# Patient Record
Sex: FEMALE | Race: ASIAN | NOT HISPANIC OR LATINO | URBAN - METROPOLITAN AREA
[De-identification: names, ages, dates, MRNs, and addresses within clinical notes are randomized per-mention and may not be internally consistent; named-entity substitution may affect disease eponyms.]

---

## 2020-04-01 ENCOUNTER — OUTPATIENT (OUTPATIENT)
Dept: OUTPATIENT SERVICES | Facility: HOSPITAL | Age: 55
LOS: 1 days | End: 2020-04-01

## 2020-04-08 ENCOUNTER — EMERGENCY (EMERGENCY)
Facility: HOSPITAL | Age: 55
LOS: 1 days | Discharge: HOME | End: 2020-04-08
Admitting: EMERGENCY MEDICINE
Payer: MEDICAID

## 2020-04-08 VITALS
TEMPERATURE: 99 F | RESPIRATION RATE: 20 BRPM | HEART RATE: 85 BPM | OXYGEN SATURATION: 98 % | SYSTOLIC BLOOD PRESSURE: 137 MMHG | DIASTOLIC BLOOD PRESSURE: 86 MMHG

## 2020-04-08 PROCEDURE — 71045 X-RAY EXAM CHEST 1 VIEW: CPT | Mod: 26

## 2020-04-08 PROCEDURE — 99283 EMERGENCY DEPT VISIT LOW MDM: CPT

## 2020-04-08 RX ORDER — LISINOPRIL/HYDROCHLOROTHIAZIDE 10-12.5 MG
1 TABLET ORAL
Qty: 0 | Refills: 0 | DISCHARGE

## 2020-04-08 RX ORDER — FENOFIBRATE,MICRONIZED 130 MG
1 CAPSULE ORAL
Qty: 0 | Refills: 0 | DISCHARGE

## 2020-04-08 RX ORDER — METFORMIN HYDROCHLORIDE 850 MG/1
1 TABLET ORAL
Qty: 0 | Refills: 0 | DISCHARGE

## 2020-04-08 NOTE — ED PROVIDER NOTE - CLINICAL SUMMARY MEDICAL DECISION MAKING FREE TEXT BOX
55 yo female with history of HTN, HLD, DM2 presents with complaint of 1 week of cough, wheezing, SOB, weakness, mild HA and decreased appetite. She denies N/V/D. On exam she maintains ambulatory oxygen saturation of 94% and 97% at rest, chest X-ray demonstrating mild patchy opacities bilaterally. Pt. advised to self quarantine x 14 days,  practice deep breathing exercises, keep hydrated and to treat symptoms such as Fever and headache with OTC Tylenol.

## 2020-04-08 NOTE — ED PROVIDER NOTE - NSFOLLOWUPINSTRUCTIONS_ED_ALL_ED_FT
You received verbal instructions and did not sign because of the risk of infection, and expressed understanding of the discharge instructions. Please followup with your doctor (call) and consider follow up in his/her office. Please practice good hand hygiene and social distancing. If fever, cough, shortness of breath, or any worse symptoms return to the ER.  If you have symptoms, consideration to quarantine yourself for 14 days from start of symptoms should be considered.  You can call [phone] for any Covid related questions or your local department of health. ([facility] Dept of Health [phone], or [facility]).

## 2020-04-08 NOTE — ED PROVIDER NOTE - OBJECTIVE STATEMENT
53 yo female with history of HTN, HLD, DM2 presents with complaint of 1 week of cough, wheezing, SOB, weakness, mild HA and decreased appetite. She denies N/V/D.

## 2020-04-08 NOTE — ED PROVIDER NOTE - CHIEF COMPLAINT
Telephone Encounter by Alda Ray RN, MSN at 05/23/17 11:15 AM     Author:  Alda Ray RN, MSN Service:  (none) Author Type:  Registered Nurse     Filed:  05/23/17 11:15 AM Encounter Date:  5/22/2017 Status:  Signed     :  Alda Ray RN, MSN (Registered Nurse)       From: Reji N Merritt  To: Amy Mariano DO  Sent: 5/22/2017  8:52 PM CDT  Subject: Acute Illness  Advice    This message is being sent by Micaela Posadas on behalf of Reji Bang    JUNAID  WHILE ON A SHORT VACATION TO [facility] IN [place] THIS PAST WEEKEND MY  DAD ENDED UP IN THE HOSPITAL .  HE PASSED OUT AT BREAKFAST  WAS   CALLED.  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 MY   BROTHERS AND  I BROUGHT HIM HOME AND ADMITTED HIM TO [facility].  WAITING ON DR. GOMEZ FOR REFEREL.       Revision History        Date/Time User Provider Type Action    > 05/23/17 11:15 AM Alda Ray RN, MSN Registered Nurse Sign    Attribution information within the note text is not available.             The patient is a 54y Female complaining of shortness of breath.

## 2020-04-08 NOTE — ED PROVIDER NOTE - PATIENT PORTAL LINK FT
You can access the FollowMyHealth Patient Portal offered by [facility] by registering at the following website: [email or web address]. By joining [employer]’s FollowMyHealth portal, you will also be able to view your health information using other applications (apps) compatible with our system.

## 2020-04-08 NOTE — ED PROVIDER NOTE - PLAN OF CARE
55 yo female with history of HTN, HLD, DM2 presents with complaint of 1 week of cough, wheezing, SOB, weakness, mild HA and decreased appetite. She denies N/V/D. On exam she maintains oxygen saturation of 94%, chest X-ray demonstrating... 53 yo female with history of HTN, HLD, DM2 presents with complaint of 1 week of cough, wheezing, SOB, weakness, mild HA and decreased appetite. She denies N/V/D. On exam she maintains ambulatory oxygen saturation of 94% and 97% at rest, chest X-ray demonstrating mild patchy opacities bilaterally. Pt. advised to self quarantine x 14 days,  practice deep breathing exercises, keep hydrated and to treat symptoms such as Fever and headache with OTC Tylenol.

## 2020-04-08 NOTE — ED PROVIDER NOTE - CARE PLAN
Principal Discharge DX:	Viral syndrome  Assessment and plan of treatment:	53 yo female with history of HTN, HLD, DM2 presents with complaint of 1 week of cough, wheezing, SOB, weakness, mild HA and decreased appetite. She denies N/V/D. On exam she maintains oxygen saturation of 94%, chest X-ray demonstrating... Principal Discharge DX:	Viral syndrome  Assessment and plan of treatment:	55 yo female with history of HTN, HLD, DM2 presents with complaint of 1 week of cough, wheezing, SOB, weakness, mild HA and decreased appetite. She denies N/V/D. On exam she maintains ambulatory oxygen saturation of 94% and 97% at rest, chest X-ray demonstrating mild patchy opacities bilaterally. Pt. advised to self quarantine x 14 days,  practice deep breathing exercises, keep hydrated and to treat symptoms such as Fever and headache with OTC Tylenol.

## 2020-04-10 DIAGNOSIS — Z71.89 OTHER SPECIFIED COUNSELING: ICD-10-CM

## 2020-06-01 ENCOUNTER — OUTPATIENT (OUTPATIENT)
Dept: OUTPATIENT SERVICES | Facility: HOSPITAL | Age: 55
LOS: 1 days | End: 2020-06-01

## 2020-06-02 DIAGNOSIS — Z71.89 OTHER SPECIFIED COUNSELING: ICD-10-CM

## 2020-06-02 PROBLEM — I10 ESSENTIAL (PRIMARY) HYPERTENSION: Chronic | Status: ACTIVE | Noted: 2020-04-08

## 2020-06-02 PROBLEM — E78.5 HYPERLIPIDEMIA, UNSPECIFIED: Chronic | Status: ACTIVE | Noted: 2020-04-08

## 2022-07-07 ENCOUNTER — EMERGENCY (EMERGENCY)
Facility: HOSPITAL | Age: 57
LOS: 1 days | Discharge: HOME | End: 2022-07-07
Attending: STUDENT IN AN ORGANIZED HEALTH CARE EDUCATION/TRAINING PROGRAM | Admitting: STUDENT IN AN ORGANIZED HEALTH CARE EDUCATION/TRAINING PROGRAM

## 2022-07-07 VITALS
HEART RATE: 67 BPM | TEMPERATURE: 98 F | RESPIRATION RATE: 17 BRPM | DIASTOLIC BLOOD PRESSURE: 81 MMHG | OXYGEN SATURATION: 100 % | SYSTOLIC BLOOD PRESSURE: 166 MMHG

## 2022-07-07 VITALS
HEART RATE: 52 BPM | RESPIRATION RATE: 20 BRPM | SYSTOLIC BLOOD PRESSURE: 140 MMHG | DIASTOLIC BLOOD PRESSURE: 72 MMHG | TEMPERATURE: 98 F | OXYGEN SATURATION: 100 %

## 2022-07-07 LAB
ALBUMIN SERPL ELPH-MCNC: 4.1 G/DL — SIGNIFICANT CHANGE UP (ref 3.3–5)
ALP SERPL-CCNC: 90 U/L — SIGNIFICANT CHANGE UP (ref 40–120)
ALT FLD-CCNC: 12 U/L — SIGNIFICANT CHANGE UP (ref 4–33)
ANION GAP SERPL CALC-SCNC: 12 MMOL/L — SIGNIFICANT CHANGE UP (ref 7–14)
AST SERPL-CCNC: 17 U/L — SIGNIFICANT CHANGE UP (ref 4–32)
BASOPHILS # BLD AUTO: 0.02 K/UL — SIGNIFICANT CHANGE UP (ref 0–0.2)
BASOPHILS NFR BLD AUTO: 0.3 % — SIGNIFICANT CHANGE UP (ref 0–2)
BILIRUB SERPL-MCNC: 0.5 MG/DL — SIGNIFICANT CHANGE UP (ref 0.2–1.2)
BUN SERPL-MCNC: 17 MG/DL — SIGNIFICANT CHANGE UP (ref 7–23)
CALCIUM SERPL-MCNC: 9.9 MG/DL — SIGNIFICANT CHANGE UP (ref 8.4–10.5)
CHLORIDE SERPL-SCNC: 103 MMOL/L — SIGNIFICANT CHANGE UP (ref 98–107)
CO2 SERPL-SCNC: 25 MMOL/L — SIGNIFICANT CHANGE UP (ref 22–31)
CREAT SERPL-MCNC: 0.64 MG/DL — SIGNIFICANT CHANGE UP (ref 0.5–1.3)
EGFR: 102 ML/MIN/1.73M2 — SIGNIFICANT CHANGE UP
EOSINOPHIL # BLD AUTO: 0.2 K/UL — SIGNIFICANT CHANGE UP (ref 0–0.5)
EOSINOPHIL NFR BLD AUTO: 3.4 % — SIGNIFICANT CHANGE UP (ref 0–6)
GLUCOSE SERPL-MCNC: 118 MG/DL — HIGH (ref 70–99)
HCT VFR BLD CALC: 38.8 % — SIGNIFICANT CHANGE UP (ref 34.5–45)
HGB BLD-MCNC: 12.2 G/DL — SIGNIFICANT CHANGE UP (ref 11.5–15.5)
IANC: 3.37 K/UL — SIGNIFICANT CHANGE UP (ref 1.8–7.4)
IMM GRANULOCYTES NFR BLD AUTO: 0.2 % — SIGNIFICANT CHANGE UP (ref 0–1.5)
LYMPHOCYTES # BLD AUTO: 1.78 K/UL — SIGNIFICANT CHANGE UP (ref 1–3.3)
LYMPHOCYTES # BLD AUTO: 30.6 % — SIGNIFICANT CHANGE UP (ref 13–44)
MAGNESIUM SERPL-MCNC: 1.7 MG/DL — SIGNIFICANT CHANGE UP (ref 1.6–2.6)
MCHC RBC-ENTMCNC: 26.3 PG — LOW (ref 27–34)
MCHC RBC-ENTMCNC: 31.4 GM/DL — LOW (ref 32–36)
MCV RBC AUTO: 83.8 FL — SIGNIFICANT CHANGE UP (ref 80–100)
MONOCYTES # BLD AUTO: 0.44 K/UL — SIGNIFICANT CHANGE UP (ref 0–0.9)
MONOCYTES NFR BLD AUTO: 7.6 % — SIGNIFICANT CHANGE UP (ref 2–14)
NEUTROPHILS # BLD AUTO: 3.37 K/UL — SIGNIFICANT CHANGE UP (ref 1.8–7.4)
NEUTROPHILS NFR BLD AUTO: 57.9 % — SIGNIFICANT CHANGE UP (ref 43–77)
NRBC # BLD: 0 /100 WBCS — SIGNIFICANT CHANGE UP
NRBC # FLD: 0 K/UL — SIGNIFICANT CHANGE UP
PLATELET # BLD AUTO: 319 K/UL — SIGNIFICANT CHANGE UP (ref 150–400)
POTASSIUM SERPL-MCNC: 3.5 MMOL/L — SIGNIFICANT CHANGE UP (ref 3.5–5.3)
POTASSIUM SERPL-SCNC: 3.5 MMOL/L — SIGNIFICANT CHANGE UP (ref 3.5–5.3)
PROT SERPL-MCNC: 7.3 G/DL — SIGNIFICANT CHANGE UP (ref 6–8.3)
RBC # BLD: 4.63 M/UL — SIGNIFICANT CHANGE UP (ref 3.8–5.2)
RBC # FLD: 15.5 % — HIGH (ref 10.3–14.5)
SODIUM SERPL-SCNC: 140 MMOL/L — SIGNIFICANT CHANGE UP (ref 135–145)
WBC # BLD: 5.82 K/UL — SIGNIFICANT CHANGE UP (ref 3.8–10.5)
WBC # FLD AUTO: 5.82 K/UL — SIGNIFICANT CHANGE UP (ref 3.8–10.5)

## 2022-07-07 PROCEDURE — G1004: CPT

## 2022-07-07 PROCEDURE — 70450 CT HEAD/BRAIN W/O DYE: CPT | Mod: 26,MG

## 2022-07-07 PROCEDURE — 99285 EMERGENCY DEPT VISIT HI MDM: CPT

## 2022-07-07 RX ORDER — CYCLOBENZAPRINE HYDROCHLORIDE 10 MG/1
5 TABLET, FILM COATED ORAL ONCE
Refills: 0 | Status: COMPLETED | OUTPATIENT
Start: 2022-07-07 | End: 2022-07-07

## 2022-07-07 RX ORDER — METOCLOPRAMIDE HCL 10 MG
10 TABLET ORAL ONCE
Refills: 0 | Status: COMPLETED | OUTPATIENT
Start: 2022-07-07 | End: 2022-07-07

## 2022-07-07 RX ORDER — LIDOCAINE 4 G/100G
1 CREAM TOPICAL ONCE
Refills: 0 | Status: COMPLETED | OUTPATIENT
Start: 2022-07-07 | End: 2022-07-07

## 2022-07-07 RX ORDER — CYCLOBENZAPRINE HYDROCHLORIDE 10 MG/1
1 TABLET, FILM COATED ORAL
Qty: 9 | Refills: 0
Start: 2022-07-07 | End: 2022-07-09

## 2022-07-07 RX ORDER — ACETAMINOPHEN 500 MG
650 TABLET ORAL ONCE
Refills: 0 | Status: COMPLETED | OUTPATIENT
Start: 2022-07-07 | End: 2022-07-07

## 2022-07-07 RX ADMIN — LIDOCAINE 1 PATCH: 4 CREAM TOPICAL at 11:20

## 2022-07-07 RX ADMIN — CYCLOBENZAPRINE HYDROCHLORIDE 5 MILLIGRAM(S): 10 TABLET, FILM COATED ORAL at 11:20

## 2022-07-07 RX ADMIN — Medication 10 MILLIGRAM(S): at 11:21

## 2022-07-07 RX ADMIN — Medication 650 MILLIGRAM(S): at 11:20

## 2022-07-07 RX ADMIN — Medication 650 MILLIGRAM(S): at 12:14

## 2022-07-07 NOTE — ED PROVIDER NOTE - OBJECTIVE STATEMENT
Pt is a 58 y/o F PMHx HTN, HLD, DM type 2 p/w headache x 1 month.  Pt reports developing intermittent, daily headaches at occipital aspect of head and bilateral posterior neck, which worsens with turning head and flexion/extension at neck, improves with Naproxen.  Presently 8/10 in intensity.  Pt denies any fevers, chills, nausea, vomiting, changes in vision/hearing, numbness, weakness, paresthesias, chest pain, SOB, abdominal pain, head trauma, LOC, dizziness, lightheadedness, use of blood thinners, ETOH abuse, illicit drug use, difficulty walking, falls.

## 2022-07-07 NOTE — ED ADULT TRIAGE NOTE - NS ED TRIAGE AVPU SCALE
[de-identified] : No new images today\par \par 6/11/2: XR right wrist 3 views OOC: +distal radius fracture with interval healing and callus formation. Visible fracture line. Acceptable alignment. \par \par  Alert-The patient is alert, awake and responds to voice. The patient is oriented to time, place, and person. The triage nurse is able to obtain subjective information.

## 2022-07-07 NOTE — ED PROVIDER NOTE - MUSCULOSKELETAL NECK EXAM
FROM; no nuchal rigidity; + tenderness/spasm at bilateral trapezius and superior insertion of trapezius at base of skull

## 2022-07-07 NOTE — ED PROVIDER NOTE - NS ED ATTENDING STATEMENT MOD
This was a shared visit with the PAIGE. I reviewed and verified the documentation and independently performed the documented:

## 2022-07-07 NOTE — ED PROVIDER NOTE - CPE EDP GU CVA TENDER
Band aid applied to finger prior to discharge. Patient (s)  given copy of dc instructions and 2 script(s). Patient (s)  verbalized understanding of instructions and script (s). Patient given a current medication reconciliation form and verbalized understanding of their medications. Patient (s) verbalized understanding of the importance of discussing medications with  his or her physician or clinic they will be following up with. Patient alert and oriented and in no acute distress. Patient discharged home ambulatory with self.
Smashed right pinky finger today causing pain, swelling and 1cm laceration, not bleeding at this time.
no tenderness

## 2022-07-07 NOTE — ED PROVIDER NOTE - ATTENDING APP SHARED VISIT CONTRIBUTION OF CARE
I have personally performed a face to face medical and diagnostic evaluation of the patient. I have discussed with and reviewed the PAIGE's note and agree with the History, ROS, Physical Exam and MDM unless otherwise indicated. A brief summary of my personal evaluation and impression can be found below.    57F hx of HTN DM2 presents with a cc of headache x1 month a/w pain to back of neck, occipital headache. worse w neck movements. Denies numbness, tingling or loss of sensation. Denies loss of motor function. Improves w NSAIDS, has not been eval'd for headaches before. No fever. Denies changes in vision. Denies n/v/f/c/cp/sob. Denies headache syncope, lightheadedness, dizziness. Denies chest palpitations, abdominal pain. Denies edema. Denies dysuria, hematuria, BRBPR, tarry stools, diarrhea, constipation.     All other ROS negative, except as above and as per HPI and ROS section.    VITALS: Initial triage and subsequent vitals have been reviewed by me.  GEN APPEARANCE: WDWN, alert, non-toxic, NAD  HEAD: Atraumatic.  EYES: PERRLa, EOMI, vision grossly intact.   NECK: Supple  CV: RRR, S1S2, no c/r/m/g. Cap refill <2 seconds. No bruits.   LUNGS: CTAB. No abnormal breath sounds.  ABDOMEN: Soft, NTND. No guarding or rebound.   MSK/EXT: No spinal or extremity point tenderness. No CVA ttp. Pelvis stable. No peripheral edema. b/l base of neck / trapezius spasm   NEURO: Alert, follows commands. Weight bearing normal. Speech normal. Sensation and motor normal x4 extremities. CN2-12 normal, coordination normal, ambulating normally.  SKIN: Warm, dry and intact. No rash.  PSYCH: Appropriate    Plan/MDM: 57F hx of HTN DM2 presents with a cc of headache x1 month a/w pain to back of neck, occipital headache. worse w neck movements. Denies numbness, tingling or loss of sensation. Denies loss of motor function. Improves w NSAIDS, has not been eval'd for headaches before. No fever. Denies changes in vision exam vss non toxic PE as above ddx c/f likely tension type HA c/b msk spasm, will check labs cth meds as needed and reassess.

## 2022-07-07 NOTE — ED ADULT NURSE NOTE - OBJECTIVE STATEMENT
58y/o F presents to ED rom 24 c/o Headaches. A&Ox4 and ambulatory at baseline. Pt has had Headaches x1 mo intermittently, 8/10 pain and nausea when they are present. HA start at the top of the head and radiate to the base of the neck. Denies blurry vision, loss of vision, dizziness, numbness and tingling. Pt has no relief with naproxen, Tylenol and aleve. PMHx of diabetes, HTN, and high cholesterol. Pt also states she has "lost 10 pounds since August." Pt currently has 8/10 pain, denies N/V/D. S1 and S2 noted upon auscultation. Lung sounds clear B/L. 20G in RAC, labs drawn and sent, medicated as per MD. Awaiting CT.

## 2022-07-07 NOTE — ED PROVIDER NOTE - PROGRESS NOTE DETAILS
RAZIA RENO:  Pt reports improvement in pain.  CT with following impression: "Unremarkable noncontrast head CT."  Pt medically stable for discharge.  Strict return precautions given.  Pt to follow up with PMD and neuro (referral list provided).  Reassessment performed and plan for discharge discussed with Dr. Wyatt who agrees with disposition and discharge plan.

## 2022-07-07 NOTE — ED PROVIDER NOTE - PHYSICAL EXAMINATION
-Cranial Nerves:  --CN II: PERRLA  --CN III, IV, VI: EOMI b/l  --CN V1-3: Facial sensation intact to touch  --CN VII: No facial asymmetry or droop  --CN VIII: Hearing intact to rubbing fingers b/l  --CN IX, X: Palate elevates symmetrically. Normal phonation  --CN XI: Heading turning and shoulder shrug intact b/l  --CN XII: Tongue midline with normal movements     Normal bilateral FTN.  Rapid alternating movements intact.  Negative rhomberg.

## 2022-07-07 NOTE — ED PROVIDER NOTE - CLINICAL SUMMARY MEDICAL DECISION MAKING FREE TEXT BOX
Pt is a 56 y/o F PMHx HTN, HLD, DM type 2 p/w headache x 1 month. -- headache not clinically concerning for SAH or meningitis; likely musculoskeletal on etiology; no neuro deficits -- labs, ct head

## 2022-07-07 NOTE — ED PROVIDER NOTE - PATIENT PORTAL LINK FT
You can access the FollowMyHealth Patient Portal offered by [facility] by registering at the following website: [email or web address]. By joining [employer]’s FollowMyHealth portal, you will also be able to view your health information using other applications (apps) compatible with our system.

## 2022-07-07 NOTE — ED ADULT TRIAGE NOTE - CHIEF COMPLAINT QUOTE
Pt c/o intermittent headaches x3 months worsening recently. Pt denies visual changes, nausea, dizziness.

## 2023-03-17 ENCOUNTER — EMERGENCY (EMERGENCY)
Facility: HOSPITAL | Age: 58
LOS: 1 days | Discharge: HOME | End: 2023-03-17
Attending: STUDENT IN AN ORGANIZED HEALTH CARE EDUCATION/TRAINING PROGRAM | Admitting: STUDENT IN AN ORGANIZED HEALTH CARE EDUCATION/TRAINING PROGRAM
Payer: MEDICAID

## 2023-03-17 VITALS
RESPIRATION RATE: 18 BRPM | HEART RATE: 76 BPM | TEMPERATURE: 98 F | SYSTOLIC BLOOD PRESSURE: 154 MMHG | OXYGEN SATURATION: 99 % | DIASTOLIC BLOOD PRESSURE: 83 MMHG

## 2023-03-17 PROCEDURE — 99284 EMERGENCY DEPT VISIT MOD MDM: CPT

## 2023-03-17 PROCEDURE — 73564 X-RAY EXAM KNEE 4 OR MORE: CPT | Mod: 26,RT

## 2023-03-17 RX ORDER — ACETAMINOPHEN 500 MG
975 TABLET ORAL ONCE
Refills: 0 | Status: COMPLETED | OUTPATIENT
Start: 2023-03-17 | End: 2023-03-17

## 2023-03-17 RX ADMIN — Medication 975 MILLIGRAM(S): at 12:51

## 2023-03-17 NOTE — ED PROVIDER NOTE - PROGRESS NOTE DETAILS
Paul Almonte, PGY2 Patient has mild improvement with meds.  X-ray shows no fractures.  Will wrap with Ace bandage and give sports medicine follow-up

## 2023-03-17 NOTE — ED ADULT NURSE NOTE - OBJECTIVE STATEMENT
Received patient in Intake 5 c/o right knee pain for few day, denies hx of injury. Patient is A&Ox4, airway patent, breathing unlabored and even. Awaiting X-ray. Side rails up and safety maintained. Fall precaution in place.

## 2023-03-17 NOTE — ED PROVIDER NOTE - ATTENDING CONTRIBUTION TO CARE
58 yo F hx DM2, presenting with complaints of R sided knee pain x 1 month. Pain is intermittent, worse when on her feet for prolonged periods of time. Of note, she had xr that showed osteoarthritis in that knee. Reports bumping her knee, but otherwise no falls/trauma. No fevers/chills. No calf pain/tenderness. On exam, well appearing, NAD, heart rrr, lungs ctab, from all extremities, point ttp over medial R knee without noted effusion. Plan for meds, xr, dc with return recs

## 2023-03-17 NOTE — ED PROVIDER NOTE - CLINICAL SUMMARY MEDICAL DECISION MAKING FREE TEXT BOX
Patient is a 57-year-old female past medical history of diabetes presents to the ED with over 1 month of right knee pain when she is bearing weight for a long time or when she is climbing stairs.    Patient appears to have worsening arthritic pains.  Low suspicion for   Neuropathic pain is pain is localized to the knee when bearing weight.  Low suspicion for DVT as leg is not swollen, tender and has no  provoking DVT factors.

## 2023-03-17 NOTE — ED PROVIDER NOTE - OBJECTIVE STATEMENT
Patient is a 57-year-old female past medical history of diabetes presents to the ED with over 1 month of right knee pain when she is bearing weight for a long time or when she is climbing stairs.  Patient works on an [place] and notes she may hit her knee quite often.  Over a month ago she had a x-ray of her right knee at outside hospital and shown to have arthritis however pain has progressively worsened.  denies any numbness or tingling, no history of neuropathy. Patient has no pain at the hips or any radiating pains, no swelling at the knee and no point tenderness.  no calf swelling or tenderness, no history of cancers, no recent surgery or injuries, no hormonal therapies no history of blood clots.

## 2023-03-17 NOTE — ED PROVIDER NOTE - PATIENT PORTAL LINK FT
You can access the FollowMyHealth Patient Portal offered by [facility] by registering at the following website: [email or web address]. By joining [employer]’s FollowMyHealth portal, you will also be able to view your health information using other applications (apps) compatible with our system.

## 2023-03-17 NOTE — ED PROVIDER NOTE - NSFOLLOWUPINSTRUCTIONS_ED_ALL_ED_FT
It was a pleasure caring for you today.  As discussed please make sure to keep your right leg elevated on a pillow while you sleep at night.  Please apply ice packs covered in cloth over the knee for max of 15 minutes at a time.  You may keep it wrapped with a Ace bandage that you are given.  Please see your primary doctor in 2-3 days for follow-up care. Return to ER for any new or worsening symptoms   Including inability to walk, swelling and redness of the knee.  Worsening pain going down the leg.     you may need to follow-up with a sports medicine doctor.  The hospital will call you to help you make an appointment if you would like.

## 2023-03-17 NOTE — ED PROVIDER NOTE - PHYSICAL EXAMINATION
Const: Well-nourished, Well-developed, appearing stated age.  Eyes: no conjunctival injection, and symmetrical lids.  HEENT: Head NCAT, no lesions. Atraumatic external nose and ears. Moist MM.  Neck: Symmetric, trachea midline.   RESP: Unlabored respiratory effort.   MSK: Lower Extremities w/o calf tenderness or edema b/l.  no point tenderness over knee, full ROM of hip and knee, no crepitus.  No edema, erythema of the knee.  Skin: Warm, dry and intact.   Neuro: CNs II-XII grossly intact. Motor & Sensation grossly intact.  Psych: Awake, Alert, & Oriented (AAO) x3. Appropriate mood and affect.

## 2023-04-28 NOTE — ED PROVIDER NOTE - NO PERTINENT FAMILY HISTORY IN FIRST DEGREE RELATIVES OF:
----- Message from Aubrey Grant sent at 4/28/2023 11:08 AM CDT -----  Regarding: appt  Contact: TREVOR ADEN [[id number]]  Type:  Same Day Appointment Request    Caller is requesting a same day appointment.  Caller declined first available appointment listed below.      Name of Caller:  Adriana, daughter    When is the first available appointment?  5/25    Symptoms:  Possible ear infection and fell this morning    Best Call Back Number:  [phone]    Additional Information: Please call to advise.             n/a

## 2023-08-30 ENCOUNTER — EMERGENCY (EMERGENCY)
Facility: HOSPITAL | Age: 58
LOS: 1 days | Discharge: HOME | End: 2023-08-30
Attending: STUDENT IN AN ORGANIZED HEALTH CARE EDUCATION/TRAINING PROGRAM | Admitting: STUDENT IN AN ORGANIZED HEALTH CARE EDUCATION/TRAINING PROGRAM
Payer: MEDICAID

## 2023-08-30 VITALS
SYSTOLIC BLOOD PRESSURE: 160 MMHG | HEART RATE: 70 BPM | OXYGEN SATURATION: 100 % | DIASTOLIC BLOOD PRESSURE: 78 MMHG | RESPIRATION RATE: 16 BRPM | TEMPERATURE: 98 F

## 2023-08-30 VITALS
HEART RATE: 67 BPM | DIASTOLIC BLOOD PRESSURE: 88 MMHG | TEMPERATURE: 98 F | OXYGEN SATURATION: 100 % | RESPIRATION RATE: 18 BRPM | SYSTOLIC BLOOD PRESSURE: 177 MMHG

## 2023-08-30 PROCEDURE — 99284 EMERGENCY DEPT VISIT MOD MDM: CPT

## 2023-08-30 PROCEDURE — 73564 X-RAY EXAM KNEE 4 OR MORE: CPT | Mod: 26,RT

## 2023-08-30 RX ORDER — KETOROLAC TROMETHAMINE 30 MG/ML
15 SYRINGE (ML) INJECTION ONCE
Refills: 0 | Status: DISCONTINUED | OUTPATIENT
Start: 2023-08-30 | End: 2023-08-30

## 2023-08-30 RX ORDER — ACETAMINOPHEN 500 MG
2 TABLET ORAL
Qty: 30 | Refills: 0
Start: 2023-08-30

## 2023-08-30 RX ORDER — ACETAMINOPHEN 500 MG
975 TABLET ORAL ONCE
Refills: 0 | Status: COMPLETED | OUTPATIENT
Start: 2023-08-30 | End: 2023-08-30

## 2023-08-30 RX ADMIN — Medication 975 MILLIGRAM(S): at 11:45

## 2023-08-30 RX ADMIN — Medication 15 MILLIGRAM(S): at 11:46

## 2023-08-30 NOTE — ED PROVIDER NOTE - ATTENDING CONTRIBUTION TO CARE
57 yo F hx HTN, HLD, chronic R knee pain, presenting for evaluation of exacerbation of R knee pain. [place], constantly on her feet and up and down stairs. Pt denies falls/trauma, no fevers/chills. Seen by ortho in the past with intra-articular injections for pain. On exam, well appearing, NAD, FROM all extremities including R knee. No large effusion, no erythema or warmth. Anterior and posterior drawer negative, no tenderness with valgus and varus stress. Plan for pain meds and reassess, likely dc and rec ortho follow up

## 2023-08-30 NOTE — ED PROVIDER NOTE - CLINICAL SUMMARY MEDICAL DECISION MAKING FREE TEXT BOX
58-year-old female with PMHx of HTN, HLD here with worsening right knee pain.  Patient has been experiencing pain over the past 10 months and had recent exacerbation.  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  Patient has recently completed physical therapy regimen which improved her pain but is still present.  Patient has been taking ibuprofen 600 mg 3 times a day for almost a month.  She denies any numbness, tingling, fever, chills.  On physical exam, there is no overlying erythema or signs of infection at this time.  Passive and active range of motion intact.  No overlying tenderness.  Plan to obtain imaging, treat symptomatically, and reevaluate.

## 2023-08-30 NOTE — ED ADULT NURSE NOTE - OBJECTIVE STATEMENT
a&ox4, well appearing, c/o rt knee pain for 3 days with mild swelling. limited mobility due to pain. meds given as ordered. fall precaution reinforced. waiting for radiology evaluation

## 2023-08-30 NOTE — ED PROVIDER NOTE - PHYSICAL EXAMINATION
Constitutional: Well-appearing, well-nourished, comfortable appearing.   Head: Normocephalic, atraumatic.   Eyes: EOMI. No conjunctival pallor.   Neck: No LAD. Supple.  CVS: Regular rate, regular rhythm. Normal S1, S2. No murmurs, rubs, or gallops. No peripheral edema noted.   Respiratory: No respiratory distress. Clear to auscultation bilaterally. No wheezing, rales, or rhonchi.   Abdomen: Abd is soft and non-distended.   Right knee: No overlying erythema, warmth, or tenderness. Normal passive and active ROM. Negative anterior and posterior drawer test.    Neuro: Alert and oriented to person, place, and time. Follows commands. Moves all extremities.   Skin: Warm and dry. No rashes.   Psych: Normal affect, cooperative.

## 2023-08-30 NOTE — ED PROVIDER NOTE - MDM ORDERS SUBMITTED SELECTION
Please advise pt his bone scan shows osteoporosis.  I am referring him to endocrinology to discuss treatment options because of his chronic kidney disease.   Imaging Studies/Medications

## 2023-08-30 NOTE — ED ADULT TRIAGE NOTE - CHIEF COMPLAINT QUOTE
Pt c/o R knee pain x3 days. States she has been going to PT for R knee and it has been feeling better  until 3 days ago. Denies injury or trauma, numbness or tingling.  HX DM, HTN, HLD

## 2023-08-30 NOTE — ED PROVIDER NOTE - CARE PLAN
1 Principal Discharge DX:	Knee pain   Principal Discharge DX:	Tricompartment osteoarthritis of right knee  Secondary Diagnosis:	Right knee pain

## 2023-08-30 NOTE — ED PROVIDER NOTE - PATIENT PORTAL LINK FT
You can access the FollowMyHealth Patient Portal offered by [facility] by registering at the following website: [email or web address]. By joining [employer]’s FollowMyHealth portal, you will also be able to view your health information using other applications (apps) compatible with our system.

## 2023-08-30 NOTE — ED PROVIDER NOTE - NSFOLLOWUPINSTRUCTIONS_ED_ALL_ED_FT
Post Op Please follow up with orthopedics regarding knee osteoarthritis. Return to the ED if symptoms worsen.      Knee Pain    WHAT YOU NEED TO KNOW:    What do I need to know about knee pain? Knee pain may start suddenly, or it may be a long-term problem. You may have pain on the side, front, or back of your knee. You may have knee stiffness and swelling. You may hear popping sounds or feel like your knee is giving way or locking up as you walk. You may feel pain when you sit, stand, walk, or climb up and down stairs.    What increases my risk for knee pain?    Obesity    A strain or tear in ligaments or tendons    A leg fracture or knee dislocation    Overuse of your knee    Osteoarthritis, rheumatoid arthritis, or gout    An infection, tumor, or cyst in your knee    Shoes that are not supportive, or training on a hard surface    Sports that involve jumping or pivoting on your knee  How is the cause of knee pain diagnosed? Your healthcare provider will examine your knee and ask about your symptoms. Tell your provider when the pain started and what you were doing at the time. Describe the pain, such as sharp, throbbing, or achy. Tell your provider about any knee injury or surgery you had. You may need any of the following:    MRI, CT, or ultrasound pictures may show an injury, fracture, or tumor.    Blood tests may be used to check the level of inflammation in your blood. The tests may also show signs of infection.    Arthroscopy is a procedure to look inside your knee joint with an arthroscope. An arthroscope is a flexible tube with a light and camera on the end. A knee arthroscopy is usually done to check for disease or damage inside your knee. These problems may be fixed during the procedure.  How is knee pain treated? Treatment will depend on the cause of your pain. You may need any of the following:    NSAIDs help decrease swelling and pain or fever. This medicine is available with or without a doctor's order. NSAIDs can cause stomach bleeding or kidney problems in certain people. If you take blood thinner medicine, always ask your healthcare provider if NSAIDs are safe for you. Always read the medicine label and follow directions.    Acetaminophen decreases pain and fever. It is available without a doctor's order. Ask how much to take and how often to take it. Follow directions. Read the labels of all other medicines you are using to see if they also contain acetaminophen, or ask your doctor or pharmacist. Acetaminophen can cause liver damage if not taken correctly.    Prescription pain medicine may be given. Ask your healthcare provider how to take this medicine safely. Some prescription pain medicines contain acetaminophen. Do not take other medicines that contain acetaminophen without talking to your healthcare provider. Too much acetaminophen may cause liver damage. Prescription pain medicine may cause constipation. Ask your healthcare provider how to prevent or treat constipation.    Steroid injections may be given into your knee. Steroids reduce inflammation and pain.    Surgery may be used for some injuries, such as to repair a torn ACL.  What can I do to manage my symptoms?    Rest your knee so it can heal. Limit activities that increase your pain. Do low-impact exercises, such as walking or swimming.    Apply ice to help reduce swelling and pain. Use an ice pack, or put crushed ice in a plastic bag. Cover it with a towel before you apply it to your knee. Apply ice for 15 to 20 minutes every hour, or as directed.    Apply compression to help reduce swelling. Use a brace or bandage only as directed.    Elevate your knee to help decrease pain and swelling. Elevate your knee while you are sitting or lying down. Prop your leg on pillows to keep your knee above the level of your heart.    Prevent your knee from moving as directed. Your healthcare provider may put on a cast or splint. You may need to wear a leg brace to stabilize your knee. A leg brace can be adjusted to increase your range of motion as your knee heals.  Hinged Knee Braces   What can I do to prevent knee pain?    Maintain a healthy weight. Extra weight increases your risk for knee pain. Ask your healthcare provider how much you should weigh. He or she can help you create a safe weight loss plan if you need to lose weight.    Exercise or train properly. Use the correct equipment for sports. Wear shoes that provide good support. Check your posture often as you exercise, play sports, or train for an event. This can help prevent stress and strain on your knees. Rest between sessions so you do not overwork your knees.  When should I seek immediate care?    Your pain is worse, even after treatment.    You cannot bend or straighten your leg completely.    The swelling around your knee does not go down even with treatment.    Your knee is painful and hot to the touch.  When should I contact my healthcare provider?    You have questions or concerns about your condition or care.    CARE AGREEMENT:    You have the right to help plan your care. Learn about your health condition and how it may be treated. Discuss treatment options with your healthcare providers to decide what care you want to receive. You always have the right to refuse treatment.

## 2023-08-30 NOTE — ED PROVIDER NOTE - OBJECTIVE STATEMENT
58-year-old female with PMHx of HTN, HLD presenting to the ED for evaluation of acute on chronic right knee pain as per patient, she states that she has been experiencing pain in her right knee over the past 2 months intermittently and has been evaluated by an orthopedist and completed physical therapy regimen.  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  Patient awoke this morning with severe knee pain unable to go to work.  She denies any fever, chills, nausea, vomiting, numbness, tingling, weakness, and any additional complaints at this time.  No recent trauma, injury, or illness.

## 2023-08-30 NOTE — ED ADULT NURSE NOTE - NSFALLRISKINTERV_ED_ALL_ED
Assistance OOB with selected safe patient handling equipment if applicable/Assistance with ambulation/Communicate fall risk and risk factors to all staff, patient, and family/Monitor gait and stability/Provide visual cue: yellow wristband, yellow gown, etc/Reinforce activity limits and safety measures with patient and family/Call bell, personal items and telephone in reach/Instruct patient to call for assistance before getting out of bed/chair/stretcher/Non-slip footwear applied when patient is off stretcher/[place] to call system/Physically safe environment - no spills, clutter or unnecessary equipment/Purposeful Proactive Rounding/Room/bathroom lighting operational, light cord in reach

## 2024-08-24 NOTE — ED PROVIDER NOTE - WR INTERPRETATION DATE TIME  1
The patient is Stable - Low risk of patient condition declining or worsening    Shift Goals  Clinical Goals: Stable neuro status, peg tube management  Patient Goals: Rest  Family Goals: CINDY    Progress made toward(s) clinical / shift goals:      Problem: Neuro Status  Goal: Neuro status will remain stable or improve  Outcome: Progressing    Q4H neuro checks in place. Pt's neurological status (A/Ox2-4) remains unchanged throughout shift. Bed alarm is on, call light within reach.     Problem: Skin Integrity  Goal: Skin integrity is maintained or improved  Outcome: Progressing   Patient's surgical site assess frequently throughout shift. Peg tube is in place, dressing is clean dry and intact.    Patient is not progressing towards the following goals:       17-Mar-2023 12:20

## 2025-06-26 ENCOUNTER — EMERGENCY (EMERGENCY)
Facility: HOSPITAL | Age: 60
LOS: 1 days | End: 2025-06-26
Attending: EMERGENCY MEDICINE | Admitting: EMERGENCY MEDICINE
Payer: COMMERCIAL

## 2025-06-26 VITALS
TEMPERATURE: 98 F | OXYGEN SATURATION: 96 % | DIASTOLIC BLOOD PRESSURE: 115 MMHG | WEIGHT: 145.06 LBS | HEART RATE: 98 BPM | RESPIRATION RATE: 16 BRPM | SYSTOLIC BLOOD PRESSURE: 203 MMHG

## 2025-06-26 VITALS
TEMPERATURE: 98 F | RESPIRATION RATE: 17 BRPM | OXYGEN SATURATION: 95 % | HEART RATE: 84 BPM | SYSTOLIC BLOOD PRESSURE: 189 MMHG | DIASTOLIC BLOOD PRESSURE: 99 MMHG

## 2025-06-26 PROCEDURE — 93010 ELECTROCARDIOGRAM REPORT: CPT

## 2025-06-26 PROCEDURE — 99284 EMERGENCY DEPT VISIT MOD MDM: CPT

## 2025-06-26 RX ORDER — ACETAMINOPHEN 500 MG/5ML
975 LIQUID (ML) ORAL ONCE
Refills: 0 | Status: COMPLETED | OUTPATIENT
Start: 2025-06-26 | End: 2025-06-26

## 2025-06-26 RX ORDER — LIDOCAINE HYDROCHLORIDE 20 MG/ML
1 JELLY TOPICAL ONCE
Refills: 0 | Status: COMPLETED | OUTPATIENT
Start: 2025-06-26 | End: 2025-06-26

## 2025-06-26 RX ORDER — CYCLOBENZAPRINE HYDROCHLORIDE 15 MG/1
1 CAPSULE, EXTENDED RELEASE ORAL
Qty: 9 | Refills: 0
Start: 2025-06-26 | End: 2025-06-28

## 2025-06-26 RX ORDER — IBUPROFEN 200 MG
600 TABLET ORAL ONCE
Refills: 0 | Status: COMPLETED | OUTPATIENT
Start: 2025-06-26 | End: 2025-06-26

## 2025-06-26 RX ORDER — LIDOCAINE HCL/PF 10 MG/ML
20 VIAL (ML) INJECTION ONCE
Refills: 0 | Status: COMPLETED | OUTPATIENT
Start: 2025-06-26 | End: 2025-06-26

## 2025-06-26 RX ORDER — CYCLOBENZAPRINE HYDROCHLORIDE 15 MG/1
5 CAPSULE, EXTENDED RELEASE ORAL ONCE
Refills: 0 | Status: COMPLETED | OUTPATIENT
Start: 2025-06-26 | End: 2025-06-26

## 2025-06-26 RX ORDER — CYCLOBENZAPRINE HYDROCHLORIDE 15 MG/1
1 CAPSULE, EXTENDED RELEASE ORAL
Qty: 9 | Refills: 0
Start: 2025-06-26 | End: 2025-07-02

## 2025-06-26 RX ADMIN — Medication 975 MILLIGRAM(S): at 20:54

## 2025-06-26 RX ADMIN — CYCLOBENZAPRINE HYDROCHLORIDE 5 MILLIGRAM(S): 15 CAPSULE, EXTENDED RELEASE ORAL at 20:54

## 2025-06-26 RX ADMIN — LIDOCAINE HYDROCHLORIDE 1 PATCH: 20 JELLY TOPICAL at 20:54

## 2025-06-26 RX ADMIN — Medication 600 MILLIGRAM(S): at 20:54

## 2025-06-26 NOTE — ED PROVIDER NOTE - PROGRESS NOTE DETAILS
Tamra Collier DO (PGY-2): Patient feels improved. Will d/c home with pain medication and return precautions. Spine referral if symptoms persist. Patient agreeable with plan.

## 2025-06-26 NOTE — ED PROVIDER NOTE - PHYSICAL EXAMINATION
GEN: NAD, awake, eyes open spontaneously  EYES: normal conjunctiva, perrl  ENT: NCAT, MMM, Trachea midline  NECK: Tenderness to palpation b/l paraspinal muscles and trapezius. Limited motion due to pain.   CHEST/LUNGS: Non-tachypneic, CTAB, bilateral breath sounds  CARDIAC: Non-tachycardic, normal perfusion  ABDOMEN: Soft, NTND, No rebound/guarding  MSK: No edema, no gross deformity of extremities.   SKIN: No rashes, no petechiae, no vesicles  NEURO: CN grossly intact, no focal motor or sensory deficits  PSYCH: Alert, appropriate, cooperative, with capacity and insight

## 2025-06-26 NOTE — ED PROVIDER NOTE - NSFOLLOWUPINSTRUCTIONS_ED_ALL_ED_FT
YOU WERE SEEN IN THE ED FOR: Neck pain    YOU WERE PRESCRIBED: Flexeril   Please take every 8 hours for muscle relaxant. Do not take before driving.   FOLLOW THE INSTRUCTIONS ON THE LABEL/CONTAINER    FOR PAIN/FEVER, YOU MAY TAKE TYLENOL (acetaminophen) 1,000mg AND/OR IBUPROFEN (advil or motrin) 600mg every 6 hours as needed. FOLLOW THE INSTRUCTIONS ON THE LABEL/CONTAINER.    Buy Salonpas 4% lidocaine patch. Place over area of greatest pain.  Apply for 12 hours at a time.  Do not use more than 2 patches per day.     PLEASE FOLLOW UP WITH YOUR PRIVATE PHYSICIAN WITHIN THE NEXT 48 HOURS. BRING COPIES OF YOUR RESULTS.    [facility] up with the [facility] if your symptoms persist. Call: [phone].     RETURN TO THE EMERGENCY DEPARTMENT IF YOU EXPERIENCE ANY NEW/CONCERNING/WORSENING SYMPTOMS SUCH AS BUT NOT LIMITED TO: worsening pain, fever, headache, confusion, numbness/tingling/weakness of the arms or any other concerns.
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
## 2025-06-26 NOTE — ED ADULT NURSE NOTE - NSFALLUNIVINTERV_ED_ALL_ED
Bed/Stretcher in lowest position, wheels locked, appropriate side rails in place/Call bell, personal items and telephone in reach/Instruct patient to call for assistance before getting out of bed/chair/stretcher/Non-slip footwear applied when patient is off stretcher/[place] to call system/Physically safe environment - no spills, clutter or unnecessary equipment/Purposeful proactive rounding/Room/bathroom lighting operational, light cord in reach
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
## 2025-06-26 NOTE — ED ADULT TRIAGE NOTE - CHIEF COMPLAINT QUOTE
ambulatory, c/o neck pain that started yesterday. difficulty with ROM. BP noted to be elevated, reports took BP meds today. denies chest pain. Phx HTN, HLD, pre-diabetes. FS 98.
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
## 2025-06-26 NOTE — ED PROCEDURE NOTE - ATTENDING CONTRIBUTION TO CARE
I performed a face-to-face evaluation of the patient and performed a history and physical examination. I agree with the history and physical examination. If this was a PA visit, I personally saw the patient with the PA and performed a substantive portion of the visit including all aspects of the medical decision making.    Taryn: I was present during the critical part of the procedure.
I performed a face-to-face evaluation of the patient and performed a history and physical examination. I agree with the history and physical examination. If this was a PA visit, I personally saw the patient with the PA and performed a substantive portion of the visit including all aspects of the medical decision making.    Taryn: I was present during the critical part of the procedure.

## 2025-06-26 NOTE — ED ADULT NURSE NOTE - OBJECTIVE STATEMENT
Pt received to intake room 7. Pt A&O x 3, ambulatory. Pt c/o mid neck pain radiating to the left side neck since yesterday. Pt endorses hx of HTN, HLD. Pt denies dizziness, headache, vision changes, chest pain, SOB, N&V, or urinary symptoms. Respirations even and unlabored. +2 pulses in all extremities. Pt medicated as per orders. Safety maintained. Pending reassessment.

## 2025-06-26 NOTE — ED PROCEDURE NOTE - GENERAL PROCEDURE DETAILS
Cleaned area. Injected lido 1 cc in area of max discomfort, then the rest in the cardinal directions
Cleaned just below L occiput w/ alcohol swab. Injected 2 cc lido: 1 cc into area of maximum discomfort, then a little in the cardinal directions.

## 2025-06-26 NOTE — ED PROVIDER NOTE - PATIENT PORTAL LINK FT
You can access the FollowMyHealth Patient Portal offered by [facility] [place] by registering at the following website: [email or web address]. By joining [facility]’s FollowMyHealth portal, you will also be able to view your health information using other applications (apps) compatible with our system.

## 2025-06-27 PROBLEM — R73.03 PREDIABETES: Chronic | Status: ACTIVE | Noted: 2023-08-30

## 2025-06-28 ENCOUNTER — EMERGENCY (EMERGENCY)
Facility: HOSPITAL | Age: 60
LOS: 1 days | End: 2025-06-28
Attending: STUDENT IN AN ORGANIZED HEALTH CARE EDUCATION/TRAINING PROGRAM | Admitting: STUDENT IN AN ORGANIZED HEALTH CARE EDUCATION/TRAINING PROGRAM
Payer: COMMERCIAL

## 2025-06-28 VITALS
HEART RATE: 76 BPM | RESPIRATION RATE: 18 BRPM | TEMPERATURE: 98 F | DIASTOLIC BLOOD PRESSURE: 107 MMHG | SYSTOLIC BLOOD PRESSURE: 170 MMHG | OXYGEN SATURATION: 96 %

## 2025-06-28 VITALS
OXYGEN SATURATION: 96 % | HEART RATE: 95 BPM | RESPIRATION RATE: 16 BRPM | HEIGHT: 60 IN | WEIGHT: 145.06 LBS | SYSTOLIC BLOOD PRESSURE: 173 MMHG | TEMPERATURE: 98 F | DIASTOLIC BLOOD PRESSURE: 102 MMHG

## 2025-06-28 PROCEDURE — 99284 EMERGENCY DEPT VISIT MOD MDM: CPT

## 2025-06-28 RX ORDER — KETOROLAC TROMETHAMINE 30 MG/ML
15 INJECTION, SOLUTION INTRAMUSCULAR; INTRAVENOUS ONCE
Refills: 0 | Status: DISCONTINUED | OUTPATIENT
Start: 2025-06-28 | End: 2025-06-28

## 2025-06-28 RX ORDER — LIDOCAINE HYDROCHLORIDE 20 MG/ML
1 JELLY TOPICAL ONCE
Refills: 0 | Status: COMPLETED | OUTPATIENT
Start: 2025-06-28 | End: 2025-06-28

## 2025-06-28 RX ORDER — DIAZEPAM 5 MG/1
5 TABLET ORAL ONCE
Refills: 0 | Status: DISCONTINUED | OUTPATIENT
Start: 2025-06-28 | End: 2025-06-28

## 2025-06-28 RX ORDER — DIAZEPAM 5 MG/1
1 TABLET ORAL
Qty: 6 | Refills: 0
Start: 2025-06-28 | End: 2025-06-29

## 2025-06-28 RX ADMIN — KETOROLAC TROMETHAMINE 15 MILLIGRAM(S): 30 INJECTION, SOLUTION INTRAMUSCULAR; INTRAVENOUS at 14:03

## 2025-06-28 RX ADMIN — LIDOCAINE HYDROCHLORIDE 1 PATCH: 20 JELLY TOPICAL at 14:03

## 2025-06-28 RX ADMIN — DIAZEPAM 5 MILLIGRAM(S): 5 TABLET ORAL at 14:06

## 2025-06-28 NOTE — ED PROVIDER NOTE - PATIENT PORTAL LINK FT
You can access the FollowMyHealth Patient Portal offered by [facility] by registering at the following website: [email or web address]. By joining "[employer]’s FollowMyHealth portal, you will also be able to view your health information using other applications (apps) compatible with our system.
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
## 2025-06-28 NOTE — ED PROVIDER NOTE - PROGRESS NOTE DETAILS
Cedric, PGY1: Pain has improved with medications and patient states she feels ready to go home. Will send home with a short course of valium given symptom relief here. Will give spin f/u and dc
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
## 2025-06-28 NOTE — ED ADULT NURSE NOTE - BIRTH SEX
[facility] Dermatology Note  Encounter Date: May 28, 2024  Office Visit     Dermatology Problem List:  # Dermatomyositis  - Bx L 3rd MCP and upper back (2/13/24): mild perivascular lymphocytic inflammation, favor partially treated eczematous dermatitis, no evidence of interface process.    - L Scalp Bx 4/2024: epidermal atrophy with rare necrotic keratinocytes, and a mild perivascular lymphocytic infiltrate   - Labs (2/13/24): LOYDA 1:1280 (nucleolar); CK and aldolase and myositis panel wnl  - Tx: methotrexate 15mg qweek & prednisone 10 mg daily, clobetasol shampoo daily, ketoconazole TIW  - prior: methotrexate methotrexate 12.5mg qweek & prednisone 20mg x 7,10mg x 7 (rx 4/25/24), clobetasol shampoo daily, ketoconazole TIW  - Methotrexate monitoring (4/8/24): CBC w diff wnl; mild Cr & BUN elevation, mild GFR decrease.    ____________________________________________    Assessment & Plan:    # Clinical exam and (subtle biopsy findings) most consistent with dermatomyositis.  To date has not had evidence of muscle involvement.    - Start 10 mg prednisone daily as itch has returned   - Increase methotrexate to 15 mg (6 tabs) weekly    - Start clobetasol ointment for itchy areas   - Get blood tests today (ESR, CRP, CMP, CBC w/ diff, CHILO antibody panel)   - CBC w/ diff & CMP for methotrexate monitoring   - ESR, CRP, CHILO antibody for ruling out other differentials   - Get mammogram and abdominal ultrasound to rule out paraneoplastic DM (has had age-appropriate colonoscopy screening in past year; never smoker); could also consider SPEP and peripheral smear if the above is unrevealing   - Return visit in 1 month    Procedures Performed:   None    Follow-up: 1 month(s) in-person, or earlier for new or changing lesions    Staff and Medical Student:     Russell Florentino MS3  [facility]    I was present with the medical student who participated in the service and in the documentation.  I have verified the  history and personally performed the physical exam and medical decision making.  I agree with the assessment and plan of care as documented in the note.      Rakesh Talley MD  Dermatology Attending    ____________________________________________    CC: Derm Problem (Patient reports that the rash has not improved on their scalp and has returned on their back and shoulders. The patient reports that the current topical medications are not working and would like to discuss treatment plan. )    HPI:  Ms. Kerri Rocha is a(n) 86 year old female who presents today as a return patient for rash. Last seen by Dr. Talley on 4/25/24 where she was started on Ketoconazole shampoo 3x per week, Clobetasol shampoo every day, prednisone for 2 weeks (20mg x 7 days and then 10mg x 7 days), and her methotrexate was increased to 12.5mg. A punch biopsy was also taken from the left parietal scalp that came back from dermatopathology as pronounced epidermal atrophy with rare necrotic keratinocytes, and a mild perivascular lymphocytic infiltrate which gives the impression of dermatomyositis. Today Kerri states that her rash has remained consistent on her scalp and has returned on her shoulders and chest despite not missing a dose of her medications or shampoos. She states that her scalp might have gotten a little better when she was on the prednisone but since then it has continued to itch. Patient claims that she has troubles sleeping and can only get sleep when she puts a moist towel on her head at night. She is also worried about her kidneys and liver with taking an increased dose of methotrexate. Denies muscle weakness, difficulty rising to stand, difficulty brushing hair.    Patient is otherwise feeling well, without additional skin concerns.    Labs:  Dermatopathology report from punch biopsy of left parietal scalp  reviewed.    Physical Exam:  Vitals: LMP  (LMP Unknown)   SKIN: Focused examination of scalp, upper back, hands,  arms, upper chest was performed.  - subtle pink to red erythematous patch on right elbow  - erythematous scaly plaque with excoriations on scalp  - erythematous scaly plaque with excoriations on upper back  - Red scaly plaques along upper nose folds  - No other lesions of concern on areas examined.     Medications:  Current Outpatient Medications   Medication Sig Dispense Refill    amLODIPine (NORVASC) 5 MG tablet Take 1 tablet (5 mg) by mouth daily 90 tablet 3    aspirin 81 MG EC tablet Take 81 mg by mouth daily      atorvastatin (LIPITOR) 20 MG tablet Take 1 tablet (20 mg) by mouth at bedtime 90 tablet 3    BOOSTRIX 5-2.5-18.5 LF-MCG/0.5 MYRIAM injection       calcium carbonate (OS-CAMMIE 600 MG [place]. [place]) 600 MG tablet Take 1,200 mg by mouth daily       Cholecalciferol (VITAMIN D3) 2000 UNITS CAPS 1,000 Int'l Units daily      clobetasol (TEMOVATE) 0.05 % external ointment Apply topically 2 times daily 60 g 1    clobetasol propionate (CLOBEX) 0.05 % external shampoo Use daily as needed for scalp itch 118 mL 4    Diphenhydramine-APAP, sleep, (TYLENOL PM EXTRA STRENGTH PO) Take by mouth nightly as needed      FLOWFLEX COVID-19 AG HOME TEST KIT USE TO TEST AT HOME FOR COVID      fluocinonide (LIDEX) 0.05 % external solution Apply topically 2 times daily To scalp for itch 60 mL 3    folic acid (FOLVITE) 1 MG tablet Take 1 tablet (1 mg) by mouth daily On days you don't take methotrexate 100 tablet 3    gabapentin (NEURONTIN) 400 MG capsule TAKE 2 CAPSULES IN THE MORNING, 2 CAPSULES AT NOON AND 1 CAPSULE IN THE EVENING 150 capsule 5    ketoconazole (NIZORAL) 2 % external shampoo Apply topically daily as needed for itching or irritation Use as scalp wash three times weekly. Lather in, let sit for five minutes, then rinse. 120 mL 11    lisinopril (ZESTRIL) 10 MG tablet Take 1 tablet (10 mg) by mouth daily 90 tablet 3    methotrexate 2.5 MG tablet Take five tablets (12.5mg total) once WEEKLY. 20 tablet 0    omega 3 1000 MG CAPS  Take 2 g by mouth      traMADol (ULTRAM) 50 MG tablet Take 1 tablet (50 mg) by mouth nightly as needed for pain 7 tablet 0     No current facility-administered medications for this visit.      Past Medical History:   Patient Active Problem List   Diagnosis    Essential hypertension    Idiopathic peripheral neuropathy    Pseudophakia of both eyes    Regular astigmatism of both eyes    Localized osteoporosis without current pathological fracture - 8/2020 waiting for dental work to start fosamax.     Mixed hyperlipidemia    Gait disorder    Vertigo    Chronic kidney disease, stage 3 (H)    Lumbar spondylosis    Lumbar facet arthropathy    Neural foraminal stenosis of lumbosacral spine    S/P lumbar spinal fusion    Rash and nonspecific skin eruption    Rash     Past Medical History:   Diagnosis Date    HLD (hyperlipidemia)     HTN (hypertension)     Idiopathic neuropathy     Vitamin D deficiency         CC Rakesh Talley MD  [address] [facility] [address]  [place] [address] on close of this encounter.     Female

## 2025-06-28 NOTE — ED PROVIDER NOTE - PHYSICAL EXAMINATION
General: well appearing, interactive, well nourished, no apparent distress, ncat  HEENT: EOMI, PERRLA, normal mucosa, normal oropharynx, no lesions on the lips or on oral mucosa, normal external ear  Neck: supple, no lymphadenopathy, full range of motion, no nuchal rigidity  CV: RRR, normal S1 and S2 with no murmur, capillary refill less than two seconds, pp2+   Resp: lungs CTA b/l, good aeration bilaterally, symmetric chest wall   Abd: non-distended, soft, non-tender  : no CVA tenderness  MSK: Hypertonicity of paraspinal cervical musculature, trapezius, SCM bilateral.  No spinal midline TTP.  Full strength 5/5 in upper extremity bilateral.   Neuro: CN2-12 grossly intact. EOMI. 5/5 strength in UE and LE b/l.  Sensation intact in UE/LE b/l.  No dysdiadochokinesia. Gait nl   Skin: no rashes, skin intact

## 2025-06-28 NOTE — ED ADULT NURSE NOTE - OBJECTIVE STATEMENT
Patient alert and oriented x4 with c.o of left side neck pain. Patient denies being any traumatic events that may have led to the pain. Denies numbness or tingling. Patient noted with soft collar on arrival. Seen in the ED yesterday for the same pain and was discharged with a prescription of flexeril 5 mg.

## 2025-06-28 NOTE — ED PROVIDER NOTE - NSPTACCESSSVCSAPPTDETAILS_ED_ALL_ED_FT
\"Have you been to the ER, urgent care clinic since your last visit?  Hospitalized since your last visit?\"    NO    “Have you seen or consulted any other health care providers outside our system since your last visit?”    NO             masses  -normal penis and testicles  Skin-no rashes or suspicious moles  Neuro normal speech, moves all extremities, normal gait  Musculoskeletal- grossly normal joint and motor function.      Assessment & Plan   Encounter for well adult exam without abnormal findings  Vaccines and safety discussed.  Attention deficit disorder (ADD) without hyperactivity   is ok.  Drug screen up to date.  -     amphetamine-dextroamphetamine (ADDERALL) 20 MG tablet; Take 1 tablet by mouth daily for 30 days. Max Daily Amount: 20 mg, Disp-30 tablet, R-0Normal  -     amphetamine-dextroamphetamine (ADDERALL) 20 MG tablet; Take 1 tablet by mouth daily for 30 days. Max Daily Amount: 20 mg, Disp-30 tablet, R-0Normal  -     amphetamine-dextroamphetamine (ADDERALL) 20 MG tablet; Take 1 tablet by mouth daily for 30 days. Max Daily Amount: 20 mg, Disp-30 tablet, R-0Normal  Obstructive sleep apnea syndrome  Follow up with Dr. Osmany ETIENNE and work on weight.      Return in about 6 months (around 4/30/2025) for Medication follow up.     Personalized Preventive Plan  Current Health Maintenance Status  Immunization History   Administered Date(s) Administered    COVID-19, MODERNA BLUE border, Primary or Immunocompromised, (age 12y+), IM, 100 mcg/0.5mL 03/31/2021, 04/30/2021    MMR, PRIORIX, M-M-R II, (age 12m+), SC, 0.5mL 05/04/1993    TDaP, ADACEL (age 10y-64y), BOOSTRIX (age 10y+), IM, 0.5mL 07/23/2007, 05/16/2016        Health Maintenance Due   Topic Date Due    Pneumococcal 0-64 years Vaccine (1 of 2 - PCV) Never done    Varicella vaccine (1 of 2 - 13+ 2-dose series) Never done    HIV screen  Never done    Hepatitis C screen  Never done    Hepatitis B vaccine (1 of 3 - 19+ 3-dose series) Never done    Flu vaccine (1) Never done    COVID-19 Vaccine (3 - 2023-24 season) 09/01/2024     Recommendations for Preventive Services Due: see orders and patient instructions/AVS.   please follow-up quickly for severe neck pain

## 2025-06-28 NOTE — ED PROVIDER NOTE - NSFOLLOWUPINSTRUCTIONS_ED_ALL_ED_FT
Neck Pain    The cause of your neck pain may not be known and may include strain of muscles or ligaments, degeneration of the spinal disks, or arthritis. Occasionally the pain may radiate down your arms(s). Over-the-counter medicines to reduce pain and inflammation are often the most helpful. Stretching and remaining active frequently helps the healing process.    Your provider today has determined that you do not need an emergent MRI. This does not mean that you may not require an MRI as an outpatient in the future if your pain persists or if you develop additional neurologic symptoms.    It is extremely important for you to follow up with your outpatient provider for further examination and discussion regarding treatment and imaging, if necessary. If you develop any of the following symptoms, return to the ED immediately for re-evaluation:    •Significant trauma or fall, especially if you are over age 65 or have osteoporosis  •Weakness or numbness in the upper limbs    - Please follow up with your Primary Care Doctor within 1 wk    - Please follow up with [facility] Call [phone] to make an appointment Neck Pain    A prescription for valium was sent to your pharmacy. Please take this as need for severe pain. Please do not drive after taking this medication.    The cause of your neck pain may not be known and may include strain of muscles or ligaments, degeneration of the spinal disks, or arthritis. Occasionally the pain may radiate down your arms(s). Over-the-counter medicines to reduce pain and inflammation are often the most helpful. Stretching and remaining active frequently helps the healing process.    Your provider today has determined that you do not need an emergent MRI. This does not mean that you may not require an MRI as an outpatient in the future if your pain persists or if you develop additional neurologic symptoms.    It is extremely important for you to follow up with your outpatient provider for further examination and discussion regarding treatment and imaging, if necessary. If you develop any of the following symptoms, return to the ED immediately for re-evaluation:    •Significant trauma or fall, especially if you are over age 65 or have osteoporosis  •Weakness or numbness in the upper limbs    - Please follow up with your Primary Care Doctor within 1 wk    - Please follow up with [facility] Call [phone] to make an appointment

## 2025-06-28 NOTE — ED ADULT TRIAGE NOTE - CHIEF COMPLAINT QUOTE
pt  was seen on Thursday and given to injection in neck for pinched nerve   pt returns still having pain in neck

## 2025-07-02 ENCOUNTER — APPOINTMENT (OUTPATIENT)
Dept: NEUROSURGERY | Facility: CLINIC | Age: 60
End: 2025-07-02
Payer: COMMERCIAL

## 2025-07-02 VITALS
HEIGHT: 60 IN | HEART RATE: 88 BPM | DIASTOLIC BLOOD PRESSURE: 94 MMHG | BODY MASS INDEX: 28.86 KG/M2 | WEIGHT: 147 LBS | SYSTOLIC BLOOD PRESSURE: 158 MMHG

## 2025-07-02 PROBLEM — Z00.00 ENCOUNTER FOR PREVENTIVE HEALTH EXAMINATION: Status: ACTIVE | Noted: 2025-07-02

## 2025-07-02 PROBLEM — M54.12 CERVICAL RADICULOPATHY, ACUTE: Status: ACTIVE | Noted: 2025-07-02

## 2025-07-02 PROCEDURE — 99203 OFFICE O/P NEW LOW 30 MIN: CPT

## 2025-08-07 ENCOUNTER — APPOINTMENT (OUTPATIENT)
Dept: MRI IMAGING | Facility: IMAGING CENTER | Age: 60
End: 2025-08-07